# Patient Record
Sex: FEMALE
[De-identification: names, ages, dates, MRNs, and addresses within clinical notes are randomized per-mention and may not be internally consistent; named-entity substitution may affect disease eponyms.]

---

## 2017-10-18 ENCOUNTER — HOSPITAL ENCOUNTER (OUTPATIENT)
Dept: HOSPITAL 92 - L&D/OP | Age: 32
Discharge: HOME | End: 2017-10-18
Attending: OBSTETRICS & GYNECOLOGY
Payer: SELF-PAY

## 2017-10-18 DIAGNOSIS — Z87.59: ICD-10-CM

## 2017-10-18 DIAGNOSIS — N76.0: ICD-10-CM

## 2017-10-18 DIAGNOSIS — O23.593: ICD-10-CM

## 2017-10-18 DIAGNOSIS — Z3A.28: ICD-10-CM

## 2017-10-18 DIAGNOSIS — O99.89: Primary | ICD-10-CM

## 2017-10-18 DIAGNOSIS — Z88.0: ICD-10-CM

## 2017-10-18 DIAGNOSIS — Z79.899: ICD-10-CM

## 2017-10-18 DIAGNOSIS — R10.9: ICD-10-CM

## 2017-10-18 DIAGNOSIS — B96.89: ICD-10-CM

## 2017-10-18 LAB
HYALINE CASTS #/AREA URNS LPF: (no result) LPF
RBC UR QL AUTO: (no result) HPF (ref 0–3)
WBC UR QL AUTO: (no result) HPF (ref 0–3)

## 2017-10-18 PROCEDURE — 87480 CANDIDA DNA DIR PROBE: CPT

## 2017-10-18 PROCEDURE — 87660 TRICHOMONAS VAGIN DIR PROBE: CPT

## 2017-10-18 PROCEDURE — 96360 HYDRATION IV INFUSION INIT: CPT

## 2017-10-18 PROCEDURE — A4353 INTERMITTENT URINARY CATH: HCPCS

## 2017-10-18 PROCEDURE — 87591 N.GONORRHOEAE DNA AMP PROB: CPT

## 2017-10-18 PROCEDURE — 76805 OB US >/= 14 WKS SNGL FETUS: CPT

## 2017-10-18 PROCEDURE — 87491 CHLMYD TRACH DNA AMP PROBE: CPT

## 2017-10-18 PROCEDURE — 81001 URINALYSIS AUTO W/SCOPE: CPT

## 2017-10-18 PROCEDURE — 87086 URINE CULTURE/COLONY COUNT: CPT

## 2017-10-18 PROCEDURE — 87510 GARDNER VAG DNA DIR PROBE: CPT

## 2017-10-18 PROCEDURE — 96361 HYDRATE IV INFUSION ADD-ON: CPT

## 2017-10-18 PROCEDURE — 82731 ASSAY OF FETAL FIBRONECTIN: CPT

## 2017-10-18 NOTE — HP
TODAY'S DATE:  10/18/2017

 

TIME:  1455.

 

LOCATION:  Labor and Delivery.

 

This is a [facility] of [facility].

 

This patient has a handwritten progress note and H\T\P in the chart.  For full 
details, please turn to that dictation.

 

HISTORY OF PRESENT ILLNESS:  In brief, this is a patient who is a multigravida 
at 28 weeks with suspected  labor due to the fact there is no evidence 
of recent abdominal trauma, vaginal bleeding or ruptured membranes.  We have 
ordered a fetal fibronectin and transvaginal ultrasound.  Cervical exam is 
currently pending as her bladder was full and patient is about to void before 
our cervical examination.  If the patient does have evidence of true  
labor or has a positive FFN and short cervix, we will give IV magnesium sulfate 
as she is under 32 weeks, give steroids for fetal lung maturation, give Ancef 
for GBS risk factor (PENICILLIN allergy) and we will notify NICU.  We also 
ordered an OB ultrasound for estimated fetal weight.  Once again, this patient 
has a full handwritten H&P and for details, please turn to the handwritten 
progress note dated today at 1450.

 

MTDD

## 2017-10-18 NOTE — PRG
DATE OF SERVICE:  10/18/2017

 

TRANSVAGINAL ULTRASOUND REPORT

 

In brief, the patient's transvaginal ultrasound has come back normal with a cervical length of 3.8. 
 We are still awaiting the fetal Fibronectin results as her cervical length is normal and she is not
 grossly dilated on exam, which have elected to do IV hydration for now and pain medication, but the
re is no true evidence of  labor at this time.  We are also awaiting the ultrasound for the e
stimated fetal weight.

## 2017-10-18 NOTE — PRG
DATE OF SERVICE:  10/18/2017

 

TIME OF EVALUATION:  15:13.

 

CERVICAL EXAM

 

In brief, the patient has now voided, we have sent the urine for urine culture and sensitivity and u
rinalysis.  I performed a transvaginal cervical exam and I find her cervix to be fingertip, 30% effa
megan, -3 station.  We are still awaiting the transvaginal ultrasound to give as a cervical length and
 an estimated fetal weight.  For now, continue IV hydration and evaluation for suspected  lab
or.

## 2017-10-18 NOTE — ULT
OB ULTRASOUND:

10/18/17

 

HISTORY: 

Back pain. Question  labor. 

 

There is a single viable intrauterine pregnancy. Gestational age by ultrasound measurement is 29 wee
ks, 0 days. Biometry measurements are consistent. 

 

Fetal heart rate: 133 bpm.

Placenta: Anterior.

Presentation: Vertex. 

Amniotic fluid: Adequate. MAXIMUS recorded at 15.8 cm. 

Cervical length: 3.8 cm. 

 

Limited anatomic evaluation. The intracranial contents, four chamber heart, stomach, gallbladder and
 cord insertion were identified. 

 

IMPRESSION:  

29 week, 0 day gestation by ultrasound measurement. 

 

POS: [place]

## 2017-10-19 NOTE — DIS
In brief, this patient was kept in Labor And Delivery this afternoon after being observed for threat
ened  labor.  Her evaluation revealed a normal cervical length by ultrasound, digital cervica
l examination, which revealed a cervix of closed to maximum fingertip, negative FFN, and other jami
eters negative.  She feels better after IV hydration, and the contractions have now spaced out.  The
 patient states that she feels more comfortable now.  As an incidental finding, we found bacterial v
aginosis on VP3 testing.  We will send her out on Flagyl 500 mg 1 p.o. b.i.d. for 7 days for bacteri
al vaginosis.

 

DIAGNOSES:

1.  Threatened  labor.

2.  Reassuring fetal status.

2.  Bacterial vaginosis.

## 2018-01-01 ENCOUNTER — HOSPITAL ENCOUNTER (INPATIENT)
Dept: HOSPITAL 92 - L&D/OP | Age: 33
LOS: 1 days | Discharge: HOME | End: 2018-01-02
Attending: SPECIALIST | Admitting: SPECIALIST
Payer: SELF-PAY

## 2018-01-01 VITALS — BODY MASS INDEX: 26.6 KG/M2

## 2018-01-01 DIAGNOSIS — Z3A.38: ICD-10-CM

## 2018-01-01 LAB
HBSAG INDEX: 0.2 S/CO (ref 0–0.99)
HGB BLD-MCNC: 10.4 G/DL (ref 12–16)
MCH RBC QN AUTO: 26.2 PG (ref 27–31)
MCV RBC AUTO: 79.9 FL (ref 81–99)
PLATELET # BLD AUTO: 208 THOU/UL (ref 130–400)
RBC # BLD AUTO: 3.96 MILL/UL (ref 4.2–5.4)
SYPHILIS ANTIBODY INDEX: 0.05 S/CO
WBC # BLD AUTO: 13 THOU/UL (ref 4.8–10.8)

## 2018-01-01 PROCEDURE — 85027 COMPLETE CBC AUTOMATED: CPT

## 2018-01-01 PROCEDURE — 87340 HEPATITIS B SURFACE AG IA: CPT

## 2018-01-01 PROCEDURE — 90715 TDAP VACCINE 7 YRS/> IM: CPT

## 2018-01-01 PROCEDURE — 90707 MMR VACCINE SC: CPT

## 2018-01-01 PROCEDURE — 36415 COLL VENOUS BLD VENIPUNCTURE: CPT

## 2018-01-01 PROCEDURE — 86780 TREPONEMA PALLIDUM: CPT

## 2018-01-01 PROCEDURE — 99285 EMERGENCY DEPT VISIT HI MDM: CPT

## 2018-01-01 RX ADMIN — DOCUSATE CALCIUM SCH MG: 240 CAPSULE, LIQUID FILLED ORAL at 21:58

## 2018-01-01 NOTE — PDOC.OPDEL
OB Operative/Delivery Note


Delivery Dr/Surgeon: Dr. Ayers, Dr. Savage


Assist: Attending: Dr. Hanna


Pre-Delivery Diagnosis: active labor


Weeks gestation: 38 (38.6)


Anesthesia: none





- Findings


  ** A


Sex: male


Weight: 3378 kg


Apgar - 1 min: 9


Apgar - 5 min: 9





- Additional Findings/Plan


Placenta delivered: spontaneous


Repaired Obstetrical Laceration: none


Estimated blood loss: 250


Post delivery plan: routine recovery

## 2018-01-02 VITALS — SYSTOLIC BLOOD PRESSURE: 97 MMHG | TEMPERATURE: 97.9 F | DIASTOLIC BLOOD PRESSURE: 52 MMHG

## 2018-01-02 LAB
HGB BLD-MCNC: 8.7 G/DL (ref 12–16)
MCH RBC QN AUTO: 26.8 PG (ref 27–31)
MCV RBC AUTO: 81.6 FL (ref 81–99)
PLATELET # BLD AUTO: 170 THOU/UL (ref 130–400)
RBC # BLD AUTO: 3.26 MILL/UL (ref 4.2–5.4)
WBC # BLD AUTO: 10.2 THOU/UL (ref 4.8–10.8)

## 2018-01-02 RX ADMIN — DOCUSATE CALCIUM SCH MG: 240 CAPSULE, LIQUID FILLED ORAL at 09:05

## 2020-01-24 NOTE — PDOC.LDHP
Labor and Delivery H&P


Chief complaint: contractions


HPI: 





33 yo  @ 38.6wks by 8.3wk US presents with contractions since 1am. 

Denies loss of fluid, vaginal bleeding, or discharge.





Prior OB hx


 term 


  demise after 34 week PPROM and Polyhydramnios


 term  


Current gestational age (weeks): 38 (38.6)


Due date: 17


Dating criteria: first trimester ultrasound (8.3)


Grav: 6


Para: 5 (6160)


OB History Details: 





see above


Current pregnancy complications: none


Abnormal US findings: No


Past Medical History: 





none


Current medications: pre- vitamins


Previous surgical history: none


Allergies/Adverse Reactions: 


 Allergies











Allergy/AdvReac Type Severity Reaction Status Date / Time


 


Penicillins Allergy   Verified 05/05/15 10:50











Social history: none





- Physical Exam


Vital signs reviewed and normal: yes


General: NAD, breathing through contractions


Heart: RRR


Lungs: CTAB


Abdomen: gravid


Extremeties: no edema


FHT: category 1


[place] contractions every: 3-4 minutes





- Vaginal Exam


cm dilated: 8


Effacement: 100%


Station: -2





- OB Labs


Blood type: O


RH: positive


Antibody Screen: negative


HIV: negative


RPR: negative


HEPSAg: negative


1 hour GCT: negative


GBS: negative


Urine drug screen: not done


Rubella: non-immune





- Assessment


L&D Assessment: term patient in labor





- Plan


Plan: admit to L&D


-: 





33 yo  @ 38.6 wks by 8.3wk US presents with contractions, admitted for 

active labor.


Plan:





1) term IUP- expectang mgmt. declines epidural.


2) grand multiparity- uterotonics prn, inc risk PPH
1/20/done